# Patient Record
Sex: MALE | Race: OTHER | Employment: FULL TIME | URBAN - METROPOLITAN AREA
[De-identification: names, ages, dates, MRNs, and addresses within clinical notes are randomized per-mention and may not be internally consistent; named-entity substitution may affect disease eponyms.]

---

## 2022-11-22 ENCOUNTER — APPOINTMENT (OUTPATIENT)
Dept: GENERAL RADIOLOGY | Age: 22
End: 2022-11-22
Attending: NURSE PRACTITIONER

## 2022-11-22 ENCOUNTER — HOSPITAL ENCOUNTER (EMERGENCY)
Age: 22
Discharge: HOME | End: 2022-11-22
Attending: EMERGENCY MEDICINE

## 2022-11-22 VITALS
DIASTOLIC BLOOD PRESSURE: 97 MMHG | SYSTOLIC BLOOD PRESSURE: 146 MMHG | BODY MASS INDEX: 38.36 KG/M2 | WEIGHT: 315 LBS | HEIGHT: 76 IN | RESPIRATION RATE: 18 BRPM | OXYGEN SATURATION: 98 % | HEART RATE: 89 BPM | TEMPERATURE: 98.2 F

## 2022-11-22 DIAGNOSIS — B34.9 VIRAL ILLNESS: ICD-10-CM

## 2022-11-22 DIAGNOSIS — D72.829 LEUKOCYTOSIS, UNSPECIFIED TYPE: ICD-10-CM

## 2022-11-22 DIAGNOSIS — R07.89 CHEST WALL PAIN: Primary | ICD-10-CM

## 2022-11-22 DIAGNOSIS — R06.02 SOB (SHORTNESS OF BREATH): ICD-10-CM

## 2022-11-22 LAB
ALBUMIN SERPL-MCNC: 4 G/DL (ref 3.5–5)
ALBUMIN/GLOB SERPL: 0.9 {RATIO} (ref 1.1–2.2)
ALP SERPL-CCNC: 103 U/L (ref 45–117)
ALT SERPL-CCNC: 61 U/L (ref 12–78)
ANION GAP SERPL CALC-SCNC: 8 MMOL/L (ref 5–15)
AST SERPL-CCNC: 29 U/L (ref 15–37)
BASOPHILS # BLD: 0.1 K/UL (ref 0–0.1)
BASOPHILS NFR BLD: 0 % (ref 0–1)
BILIRUB SERPL-MCNC: 0.4 MG/DL (ref 0.2–1)
BUN SERPL-MCNC: 8 MG/DL (ref 6–20)
BUN/CREAT SERPL: 10 (ref 12–20)
CALCIUM SERPL-MCNC: 9.5 MG/DL (ref 8.5–10.1)
CHLORIDE SERPL-SCNC: 103 MMOL/L (ref 97–108)
CO2 SERPL-SCNC: 26 MMOL/L (ref 21–32)
COMMENT, HOLDF: NORMAL
CREAT SERPL-MCNC: 0.83 MG/DL (ref 0.7–1.3)
DIFFERENTIAL METHOD BLD: ABNORMAL
EOSINOPHIL # BLD: 0.2 K/UL (ref 0–0.4)
EOSINOPHIL NFR BLD: 1 % (ref 0–7)
ERYTHROCYTE [DISTWIDTH] IN BLOOD BY AUTOMATED COUNT: 13.2 % (ref 11.5–14.5)
FLUAV AG NPH QL IA: NEGATIVE
FLUBV AG NOSE QL IA: NEGATIVE
GLOBULIN SER CALC-MCNC: 4.7 G/DL (ref 2–4)
GLUCOSE SERPL-MCNC: 96 MG/DL (ref 65–100)
HCT VFR BLD AUTO: 44.9 % (ref 36.6–50.3)
HGB BLD-MCNC: 14.7 G/DL (ref 12.1–17)
IMM GRANULOCYTES # BLD AUTO: 0.1 K/UL (ref 0–0.04)
IMM GRANULOCYTES NFR BLD AUTO: 1 % (ref 0–0.5)
LYMPHOCYTES # BLD: 3.6 K/UL (ref 0.8–3.5)
LYMPHOCYTES NFR BLD: 18 % (ref 12–49)
MCH RBC QN AUTO: 27.9 PG (ref 26–34)
MCHC RBC AUTO-ENTMCNC: 32.7 G/DL (ref 30–36.5)
MCV RBC AUTO: 85.4 FL (ref 80–99)
MONOCYTES # BLD: 1.1 K/UL (ref 0–1)
MONOCYTES NFR BLD: 5 % (ref 5–13)
NEUTS SEG # BLD: 15.2 K/UL (ref 1.8–8)
NEUTS SEG NFR BLD: 75 % (ref 32–75)
NRBC # BLD: 0 K/UL (ref 0–0.01)
NRBC BLD-RTO: 0 PER 100 WBC
PLATELET # BLD AUTO: 375 K/UL (ref 150–400)
PMV BLD AUTO: 10 FL (ref 8.9–12.9)
POTASSIUM SERPL-SCNC: 3.8 MMOL/L (ref 3.5–5.1)
PROT SERPL-MCNC: 8.7 G/DL (ref 6.4–8.2)
RBC # BLD AUTO: 5.26 M/UL (ref 4.1–5.7)
SAMPLES BEING HELD,HOLD: NORMAL
SODIUM SERPL-SCNC: 137 MMOL/L (ref 136–145)
TROPONIN-HIGH SENSITIVITY: 4 NG/L (ref 0–76)
WBC # BLD AUTO: 20.2 K/UL (ref 4.1–11.1)

## 2022-11-22 PROCEDURE — 84484 ASSAY OF TROPONIN QUANT: CPT

## 2022-11-22 PROCEDURE — 80053 COMPREHEN METABOLIC PANEL: CPT

## 2022-11-22 PROCEDURE — 74011250636 HC RX REV CODE- 250/636: Performed by: NURSE PRACTITIONER

## 2022-11-22 PROCEDURE — 74011250637 HC RX REV CODE- 250/637: Performed by: NURSE PRACTITIONER

## 2022-11-22 PROCEDURE — 93005 ELECTROCARDIOGRAM TRACING: CPT

## 2022-11-22 PROCEDURE — 99285 EMERGENCY DEPT VISIT HI MDM: CPT

## 2022-11-22 PROCEDURE — 85025 COMPLETE CBC W/AUTO DIFF WBC: CPT

## 2022-11-22 PROCEDURE — 71046 X-RAY EXAM CHEST 2 VIEWS: CPT

## 2022-11-22 PROCEDURE — 36415 COLL VENOUS BLD VENIPUNCTURE: CPT

## 2022-11-22 PROCEDURE — 87804 INFLUENZA ASSAY W/OPTIC: CPT

## 2022-11-22 PROCEDURE — 96374 THER/PROPH/DIAG INJ IV PUSH: CPT

## 2022-11-22 RX ORDER — GUAIFENESIN 100 MG/5ML
324 LIQUID (ML) ORAL DAILY
Status: DISCONTINUED | OUTPATIENT
Start: 2022-11-23 | End: 2022-11-22

## 2022-11-22 RX ORDER — ONDANSETRON 2 MG/ML
4 INJECTION INTRAMUSCULAR; INTRAVENOUS
Status: COMPLETED | OUTPATIENT
Start: 2022-11-22 | End: 2022-11-22

## 2022-11-22 RX ORDER — GUAIFENESIN 100 MG/5ML
324 LIQUID (ML) ORAL DAILY
Status: DISCONTINUED | OUTPATIENT
Start: 2022-11-22 | End: 2022-11-22 | Stop reason: HOSPADM

## 2022-11-22 RX ADMIN — ASPIRIN 324 MG: 81 TABLET, CHEWABLE ORAL at 14:35

## 2022-11-22 RX ADMIN — ONDANSETRON 4 MG: 2 INJECTION INTRAMUSCULAR; INTRAVENOUS at 14:28

## 2022-11-22 NOTE — ED PROVIDER NOTES
60-year-old male, morbidly obese presenting ambulatory to the ER for evaluation of left side CP, non-radiating pressure associated with SOB, nausea, cough, chills when laying flat and on exertion that started last night while at rest. Denies previous HX arrhythmia or PE. No medications for symptoms. Everyday tobacco user, denies alcohol or illicit drug use. Eating and drinking as usual.   Denies fever, congestion, sore throat, abdominal pain V/D, difficulty urinating or dysuria. No past medical history on file. No past surgical history on file. No family history on file. Social History     Socioeconomic History    Marital status: SINGLE     Spouse name: Not on file    Number of children: Not on file    Years of education: Not on file    Highest education level: Not on file   Occupational History    Not on file   Tobacco Use    Smoking status: Not on file    Smokeless tobacco: Not on file   Substance and Sexual Activity    Alcohol use: Not on file    Drug use: Not on file    Sexual activity: Not on file   Other Topics Concern    Not on file   Social History Narrative    Not on file     Social Determinants of Health     Financial Resource Strain: Not on file   Food Insecurity: Not on file   Transportation Needs: Not on file   Physical Activity: Not on file   Stress: Not on file   Social Connections: Not on file   Intimate Partner Violence: Not on file   Housing Stability: Not on file         ALLERGIES: Patient has no known allergies. Review of Systems   Constitutional:  Positive for chills. Negative for appetite change and fever. HENT: Negative. Respiratory:  Positive for cough and shortness of breath. Negative for wheezing. Cardiovascular:  Positive for chest pain. Left side   Gastrointestinal:  Negative for abdominal pain, diarrhea, nausea and vomiting. Genitourinary:  Negative for difficulty urinating.    Neurological:  Negative for dizziness, light-headedness and headaches. Vitals:    11/22/22 1315 11/22/22 1459   BP: (!) 170/114 (!) 146/97   Pulse: 99 89   Resp: 18    Temp: 98.2 °F (36.8 °C)    SpO2: 98%    Weight: 149.7 kg (330 lb)    Height: 6' 4\" (1.93 m)             Physical Exam  Vitals and nursing note reviewed. Constitutional:       General: He is not in acute distress. Appearance: Normal appearance. He is obese. He is not ill-appearing. HENT:      Head: Normocephalic. Nose: Nose normal.      Mouth/Throat:      Mouth: Mucous membranes are moist.   Eyes:      Pupils: Pupils are equal, round, and reactive to light. Cardiovascular:      Rate and Rhythm: Normal rate. Pulmonary:      Effort: Pulmonary effort is normal. No respiratory distress. Breath sounds: Normal breath sounds. No decreased breath sounds or wheezing. Chest:      Chest wall: No mass, tenderness or crepitus. Abdominal:      General: Bowel sounds are normal. There is no distension. Palpations: Abdomen is soft. Musculoskeletal:         General: Normal range of motion. Cervical back: Normal range of motion. Right lower leg: No edema. Left lower leg: No edema. Skin:     General: Skin is warm and dry. Capillary Refill: Capillary refill takes less than 2 seconds. Neurological:      General: No focal deficit present. Mental Status: He is alert and oriented to person, place, and time.    Psychiatric:         Mood and Affect: Mood normal.        MDM  Number of Diagnoses or Management Options  Chest wall pain: new and requires workup  Leukocytosis, unspecified type: new and requires workup  SOB (shortness of breath): new and requires workup  Viral illness: new and requires workup  Diagnosis management comments: Differential DX: flu vs arrhyhtmia vs hyperglycemia vs pneumonia       Amount and/or Complexity of Data Reviewed  Clinical lab tests: ordered and reviewed  Tests in the radiology section of CPT®: ordered and reviewed  Discuss the patient with other providers: yes    Risk of Complications, Morbidity, and/or Mortality  Presenting problems: moderate  Diagnostic procedures: moderate  Management options: moderate           Procedures  VITAL SIGNS:  Patient Vitals for the past 4 hrs:   Temp Pulse Resp BP SpO2   11/22/22 1459 -- 89 -- (!) 146/97 --   11/22/22 1315 98.2 °F (36.8 °C) 99 18 (!) 170/114 98 %         LABS:  Recent Results (from the past 6 hour(s))   CBC WITH AUTOMATED DIFF    Collection Time: 11/22/22  2:21 PM   Result Value Ref Range    WBC 20.2 (H) 4.1 - 11.1 K/uL    RBC 5.26 4.10 - 5.70 M/uL    HGB 14.7 12.1 - 17.0 g/dL    HCT 44.9 36.6 - 50.3 %    MCV 85.4 80.0 - 99.0 FL    MCH 27.9 26.0 - 34.0 PG    MCHC 32.7 30.0 - 36.5 g/dL    RDW 13.2 11.5 - 14.5 %    PLATELET 522 058 - 519 K/uL    MPV 10.0 8.9 - 12.9 FL    NRBC 0.0 0  WBC    ABSOLUTE NRBC 0.00 0.00 - 0.01 K/uL    NEUTROPHILS 75 32 - 75 %    LYMPHOCYTES 18 12 - 49 %    MONOCYTES 5 5 - 13 %    EOSINOPHILS 1 0 - 7 %    BASOPHILS 0 0 - 1 %    IMMATURE GRANULOCYTES 1 (H) 0.0 - 0.5 %    ABS. NEUTROPHILS 15.2 (H) 1.8 - 8.0 K/UL    ABS. LYMPHOCYTES 3.6 (H) 0.8 - 3.5 K/UL    ABS. MONOCYTES 1.1 (H) 0.0 - 1.0 K/UL    ABS. EOSINOPHILS 0.2 0.0 - 0.4 K/UL    ABS. BASOPHILS 0.1 0.0 - 0.1 K/UL    ABS. IMM. GRANS. 0.1 (H) 0.00 - 0.04 K/UL    DF AUTOMATED     METABOLIC PANEL, COMPREHENSIVE    Collection Time: 11/22/22  2:21 PM   Result Value Ref Range    Sodium 137 136 - 145 mmol/L    Potassium 3.8 3.5 - 5.1 mmol/L    Chloride 103 97 - 108 mmol/L    CO2 26 21 - 32 mmol/L    Anion gap 8 5 - 15 mmol/L    Glucose 96 65 - 100 mg/dL    BUN 8 6 - 20 MG/DL    Creatinine 0.83 0.70 - 1.30 MG/DL    BUN/Creatinine ratio 10 (L) 12 - 20      eGFR >60 >60 ml/min/1.73m2    Calcium 9.5 8.5 - 10.1 MG/DL    Bilirubin, total 0.4 0.2 - 1.0 MG/DL    ALT (SGPT) 61 12 - 78 U/L    AST (SGOT) 29 15 - 37 U/L    Alk.  phosphatase 103 45 - 117 U/L    Protein, total 8.7 (H) 6.4 - 8.2 g/dL    Albumin 4.0 3.5 - 5.0 g/dL    Globulin 4.7 (H) 2.0 - 4.0 g/dL    A-G Ratio 0.9 (L) 1.1 - 2.2     SAMPLES BEING HELD    Collection Time: 11/22/22  2:21 PM   Result Value Ref Range    SAMPLES BEING HELD 1BL     COMMENT        Add-on orders for these samples will be processed based on acceptable specimen integrity and analyte stability, which may vary by analyte. TROPONIN-HIGH SENSITIVITY    Collection Time: 11/22/22  2:21 PM   Result Value Ref Range    Troponin-High Sensitivity 4 0 - 76 ng/L   INFLUENZA A+B VIRAL AGS    Collection Time: 11/22/22  2:21 PM   Result Value Ref Range    Influenza A Antigen Negative NEG      Influenza B Antigen Negative NEG     EKG, 12 LEAD, INITIAL    Collection Time: 11/22/22  2:45 PM   Result Value Ref Range    Ventricular Rate 98 BPM    Atrial Rate 98 BPM    P-R Interval 146 ms    QRS Duration 86 ms    Q-T Interval 332 ms    QTC Calculation (Bezet) 423 ms    Calculated P Axis 51 degrees    Calculated R Axis 29 degrees    Calculated T Axis 16 degrees    Diagnosis       Normal sinus rhythm  Normal ECG  No previous ECGs available          IMAGING:  XR CHEST PA LAT   Final Result      Normal PA and lateral chest views. Medications During Visit:  Medications   aspirin chewable tablet 324 mg (324 mg Oral Given 11/22/22 1435)   ondansetron (ZOFRAN) injection 4 mg (4 mg IntraVENous Given 11/22/22 1428)         DECISION MAKING:  Usama Mohr is a 25 y.o. male who comes in as above. Morbidly obese 71-year-old male presenting ambulatory to the ER for evaluation of chest pain to the left side of his chest that started last night while at rest.  Pain is worse when lying flat and associated shortness of breath upon exertion, chills, and nausea. Denies previous history of PE or arrhythmia, smokes cigarettes daily. Eating and drinking without V/D. No PCP, did not take anything for the discomfort PTA.   Discussed plan with patient check basic labs, will add on D-dimer and troponin, chest x-ray, patient's blood pressure is quite elevated currently 170/114. Denies headache, vision change or feeling dizzy or lightheaded. Will medicate for the discomfort and provide antiemetic and reevaluate. Patient reevaluated, leukocytosis of 20.2, no shift in the neutrophils, lymphs oocytes, monocyte, eosinophils or basophils. Electrolytes within normal limits,   Troponin 4, influenza negative, no anemia, x-ray show normal PA and lateral views and EKG show normal sinus rhythm. Patient remains hemodynamically stable, BP is improved 146/97, discussed patient that he may use over-the-counter medications to help manage symptoms, to rest increase fluids, to follow-up with [facility] establish care with PCP and return to the ER with worsening of symptoms. Work note provided, patient verbalized understanding agrees with plan. Advised patient to follow up with PCP in regard to elevated WBC. IMPRESSION:  1. Chest wall pain    2. SOB (shortness of breath)    3. Viral illness    4. Leukocytosis, unspecified type        DISPOSITION:  Discharged      There are no discharge medications for this patient. Follow-up Information       Follow up With Specialties Details Why Contact Info    Establish care with Primary Care. Please call and schedule an appointment with [facility] while you are waiting to get into see a primary care for further evaluation. [facility] EM[facility]GENCY DEPT Emergency Medicine  If symptoms worsen [facility]  [phone]              The patient is asked to follow-up with their primary care provider in the next several days. They are to call tomorrow for an appointment. The patient is asked to return promptly for any increased concerns or worsening of symptoms. They can return to this emergency department or any other emergency department.     Renee Coello NP  4:04 PM

## 2022-11-22 NOTE — ED TRIAGE NOTES
Pt to ED for shortness of breath, nausea, cough, and chest pain, exacerbated with exertion and laying flat. Denies leg swelling, fever, chills, diarrhea, abdominal pain.  Has not taken meds for sx

## 2022-11-22 NOTE — Clinical Note
[address]  [facility] EMERGENCY DEPT  [address] [id number]  [phone]    Work/School Note    Date: 11/22/2022    To Whom It May concern:      Chandler Adames was seen and treated today in the emergency room by the following provider(s):  Attending Provider: John Mathur MD  Nurse Practitioner: Sd Zheng NP. Chandler Adames is excused from work/school on 11/22/22. He is clear to return to work/school on 11/23/22.         Sincerely,          Lashon Jo NP

## 2022-11-22 NOTE — Clinical Note
[address]  [facility] EMERGENCY DEPT  [address] [phone]  [phone]    Work/School Note    Date: 11/22/2022    To Whom It May concern:      Brielle Sandhu was seen and treated today in the emergency room by the following provider(s):  Attending Provider: Chidi Camargo MD  Nurse Practitioner: Yair Ziegler NP. Brielle Sandhu is excused from work/school on 11/22/22. He is clear to return to work/school on 11/23/22.         Sincerely,          García Guzman NP

## 2022-11-22 NOTE — DISCHARGE INSTRUCTIONS
You may take Tylenol or ibuprofen to help manage your pain. Your evaluated in the ER today for left sided chest pain. Your EKG showed normal sinus rhythm, your cardiac enzymes were within normal limits and your chest x-ray showed no pneumonia or infection, your flu swab was negative. You do have an elevated white blood cell count however this may be contributed to a viral infection. Return to the ER with worsening of symptoms. You may manage any other symptoms that you have for over-the-counter medications. [facility] Departments     For adult and child immunizations, family planning, TB screening, STD testing and women's health services. [facility]: [place] [phone]      [facility] 25   [address]   [address]   170 [facility]: [address] [phone]      [address] [facility]          [address] [address]     For primary care services, woman and child wellness, and some clinics providing specialty care. [facility] -- [facility] [facility]. [address] [place] [address] [phone]   [address] [place] [address] 200 [facility] [address] [place] [address] [phone]   [address] [facility] [address] [facility] [facility] [address] [phone]   [id number] [place] [address] [address] [place] [address]  [phone]   [address] [place] [address] [address] [phone]   [facility] 81 [facility] [phone]   Wen Maldonado [facility] ([place] [address] [place] [address] [phone]   [facility]: [facility] [address] [facility] [address] [phone]     [place] [address] [facility] [address] [phone]   [facility] Out[facility] 58[address] [facility]  [phone]   [employer] [facility]  [address] ([email or web address]) [phone]         Sexual Health/Woman Wellness Clinics    For STD/HIV testing and treatment, pregnancy testing and services, men's health, birth control services, LGBT services, and hepatitis/HPV vaccine services. [facility] [place] [employer] [address] [facility] [address] [facility] [address] [facility] 1579 [address] [phone]   [facility] [address], 5th floor [phone]   Pregnancy [address] for Women [address]  [place] [phone] [address] [facility] [address] [place] [address]   [phone]   [place]   [phone]   Women, Infant and Children's Services: [address] [phone]       600 [facility]   [phone]   [facility]   [phone]   [facility]     [phone]   [address] Crisis   1212 [facility] [phone]       Local Primary Care Physicians  64 [facility] [phone]  MD Kori Smith MD Forbes Blades, MD [facility] Doctors [phone]  Camryn Frederick, [facility]  MD Paul Francis MD Cyrus Brunswick, MD [address] [address] [phone]  MD Afsaneh Ortega [place] [address] [facility] [phone]  Darris Severs, MD Bertell Rosenthal, MD Ruta Bee, MD Rosezella Shine, MD   [facility] [phone]  [facility] MD Madalyn AMADOR MD Jessica Dibbles, NP [address] [phone]  Elly Bayard, MD Lionel Mcgregor, MD Patsey Provost, MD Squire Presume, MD Valri Epley, MD Jadene Dock, MD Dianna Erie, MD   [facility] [phone]  John Webb MD [facility] [phone]  MD Isabel Jackson, ADARSH Das, MD Maxine Rivers MD Yocasta Mohan, MD Marquita Coronel, MD   [address] [facility] [phone]  Rboson Zavala, MD Courtney Forrester, FNP  Hali Hampton, ADARSH Patino, MD Joe Rod, MD Ayesha Gates, MD [facility] [phone]  Tabitha Matthews, MD Saumya Tran, MD Priscila Deras, MD David Cardona, MD Suzzanne Frankel, MD   [facility] [phone]  [facility], MD Liss Valdez, MD [place] [phone]  MD Ciara [place], MD Claude Martinez, MD   [facility] [phone]  Enma Chowdhury, MD Joni Howard, MD Severiano Guthrie, MD Aziza Ortiz, MD Simón Morrow, MD Lisbeth Chaudhary, NP  Yves Hicks MD 1619 [facility]   [phone]  MD Nury Green, MD Lolly Ahn MD   2102 [facility] [phone]  [place] [address], MD Renny Crockett, P  Dalton Calderon, PAKarolineC  Dalton Calderon, FNP  Elroy Galan, PARAG Quezada, MD Shanique Douglas, NP   Starcammy Puls, DO Miscellaneous:  Claudio Varma [phone]

## 2022-11-23 LAB
ATRIAL RATE: 98 BPM
CALCULATED P AXIS, ECG09: 51 DEGREES
CALCULATED R AXIS, ECG10: 29 DEGREES
CALCULATED T AXIS, ECG11: 16 DEGREES
DIAGNOSIS, 93000: NORMAL
P-R INTERVAL, ECG05: 146 MS
Q-T INTERVAL, ECG07: 332 MS
QRS DURATION, ECG06: 86 MS
QTC CALCULATION (BEZET), ECG08: 423 MS
VENTRICULAR RATE, ECG03: 98 BPM